# Patient Record
Sex: FEMALE | Race: WHITE
[De-identification: names, ages, dates, MRNs, and addresses within clinical notes are randomized per-mention and may not be internally consistent; named-entity substitution may affect disease eponyms.]

---

## 2018-09-07 ENCOUNTER — HOSPITAL ENCOUNTER (OUTPATIENT)
Dept: HOSPITAL 76 - DI | Age: 33
Discharge: HOME | End: 2018-09-07
Attending: NURSE PRACTITIONER
Payer: COMMERCIAL

## 2018-09-07 DIAGNOSIS — S62.626A: Primary | ICD-10-CM

## 2019-10-10 ENCOUNTER — HOSPITAL ENCOUNTER (OUTPATIENT)
Dept: HOSPITAL 76 - SDS | Age: 34
Discharge: HOME | End: 2019-10-10
Attending: SURGERY
Payer: COMMERCIAL

## 2019-10-10 VITALS — DIASTOLIC BLOOD PRESSURE: 61 MMHG | SYSTOLIC BLOOD PRESSURE: 103 MMHG

## 2019-10-10 DIAGNOSIS — K64.8: ICD-10-CM

## 2019-10-10 DIAGNOSIS — Z12.11: Primary | ICD-10-CM

## 2019-10-10 DIAGNOSIS — Z80.0: ICD-10-CM

## 2019-10-10 LAB
HCG UR QL: NEGATIVE
SP GR UR STRIP.AUTO: 1.01 (ref 1–1.03)

## 2019-10-10 PROCEDURE — 81025 URINE PREGNANCY TEST: CPT

## 2019-10-10 PROCEDURE — 45378 DIAGNOSTIC COLONOSCOPY: CPT

## 2019-10-10 PROCEDURE — 0DJD8ZZ INSPECTION OF LOWER INTESTINAL TRACT, VIA NATURAL OR ARTIFICIAL OPENING ENDOSCOPIC: ICD-10-PCS | Performed by: SURGERY

## 2022-05-24 NOTE — XRAY REPORT
Concern of patient: Pt son [place] called stating that pt is having issues with slow urine flow.     Patient return phone number: Please contact son  [phone]           Reason:  RIGHT HAND DIGIT #5 SWELLING AND LIMITED ROM

Procedure Date:  09/07/2018   

Accession Number:  [id number] / [id number]                    

Procedure:  XR  - Hand 3 View RT CPT Code:  

 

FULL RESULT:

 

 

EXAM:

RIGHT HAND RADIOGRAPHY

 

EXAM DATE: 9/7/2018 12:46 PM.

 

CLINICAL HISTORY: RIGHT HAND DIGIT  5 SWELLING AND LIMITED ROM.

 

COMPARISON: None.

 

TECHNIQUE: 3 views.

 

FINDINGS:

 

Bones: Tiny avulsion fracture seen on the lateral view at the base of the 

fifth middle phalanx at the palmar aspect of the PIP.

 

Joints: No subluxations.

 

Soft Tissues: Unremarkable.

IMPRESSION:

 

Tiny avulsion fracture at the base of the fifth middle phalanx.

 

RADIA